# Patient Record
Sex: FEMALE | Race: AMERICAN INDIAN OR ALASKA NATIVE
[De-identification: names, ages, dates, MRNs, and addresses within clinical notes are randomized per-mention and may not be internally consistent; named-entity substitution may affect disease eponyms.]

---

## 2018-06-21 ENCOUNTER — HOSPITAL ENCOUNTER (OUTPATIENT)
Dept: HOSPITAL 5 - SPVWC | Age: 43
Discharge: HOME | End: 2018-06-21
Attending: OBSTETRICS & GYNECOLOGY
Payer: COMMERCIAL

## 2018-06-21 DIAGNOSIS — Z12.31: Primary | ICD-10-CM

## 2018-06-21 PROCEDURE — 77067 SCR MAMMO BI INCL CAD: CPT

## 2018-06-21 NOTE — MAMMOGRAPHY REPORT
Screening mammogram:



Patient had bilateral breast reduction surgery. Routine views 

demonstrates an area of small ovoid density in the central posterior 

right breast seen only in the CC projection. This is unchanged compared 

to her prior exam in September 2016. The MLO projections are unchanged 

and unremarkable.



CAD used.



Impression:



Stable exam bilaterally with stable right breast asymmetry.



Recommendation:



Annual mammogram followup.



BI-RADS CATEGORY:  2 = Benign



ACR BI-RADS MAMMOGRAPHIC CODES:

0 = Needs additional imaging evaluation; 1 = Negative; 2 = Benign; 3 = 

Probably benign; 4 = Suspicious; 5 = Malignant; 6 = Known biopsy-proven 

malignancy



COMMENT:

      1.   Dense breast tissue, i.e., adenosis, fibrocystic 

            changes, etc., may obscure an underlying neoplasm.

      2.   Approximately 10% of cancers are not detected with

            mammography.

      3.   A negative mammography report should not delay biopsy 

            if a clinically suspicious mass is present.

## 2019-11-26 ENCOUNTER — HOSPITAL ENCOUNTER (OUTPATIENT)
Dept: HOSPITAL 5 - SPVWC | Age: 44
Discharge: HOME | End: 2019-11-26
Attending: OBSTETRICS & GYNECOLOGY
Payer: COMMERCIAL

## 2019-11-26 DIAGNOSIS — Z12.31: Primary | ICD-10-CM

## 2019-11-26 PROCEDURE — 77067 SCR MAMMO BI INCL CAD: CPT

## 2019-11-27 NOTE — MAMMOGRAPHY REPORT
DIGITAL SCREENING MAMMOGRAM WITH CAD, 11/26/2019



INDICATION: Routine screening mammography. 



TECHNIQUE:  Digital bilateral  2D mammography was obtained in the craniocaudal and mediolateral obliq
ue projections. This examination was interpreted with the benefit of Computer-Aided Detection analysi
s.



COMPARISON: 6/21/2018



FINDINGS: 



Breast Density: There are scattered areas of fibroglandular density.



There is no evidence of dominant mass, suspicious calcifications or architectural distortion in eithe
r breast.



IMPRESSION: No mammographic evidence of malignancy.





Follow up recommendation: Routine yearly



BI-RADS Category 1:  Negative.



A "normal" or negative report should not discourage follow up or biopsy of a clinically significant f
inding.



A written summary of these findings will be mailed to the patient. The patient will be entered into a
 mammography reporting system which will generate a reminder letter for the patient's next appointmen
t at the appropriate interval.



The [employer] recommends yearly mammograms starting at age 40 and continuing as l
gino as a woman is in good health.  Breast MRI is recommended for women with an approximate 20-25% or 
greater lifetime risk of breast cancer, including women with a strong family history of breast or ova
elva cancer or who have been treated for Hodgkin's disease.



Signer Name: Wayne Jeter MD 

Signed: 11/27/2019 1:01 PM

 Workstation Name: [id number]